# Patient Record
Sex: FEMALE | Race: WHITE | NOT HISPANIC OR LATINO | Employment: OTHER | URBAN - METROPOLITAN AREA
[De-identification: names, ages, dates, MRNs, and addresses within clinical notes are randomized per-mention and may not be internally consistent; named-entity substitution may affect disease eponyms.]

---

## 2017-01-13 ENCOUNTER — LAB VISIT (OUTPATIENT)
Dept: LAB | Facility: HOSPITAL | Age: 77
End: 2017-01-13
Attending: INTERNAL MEDICINE
Payer: MEDICARE

## 2017-01-13 ENCOUNTER — OFFICE VISIT (OUTPATIENT)
Dept: TRANSPLANT | Facility: CLINIC | Age: 77
End: 2017-01-13
Attending: INTERNAL MEDICINE
Payer: MEDICARE

## 2017-01-13 VITALS
SYSTOLIC BLOOD PRESSURE: 165 MMHG | WEIGHT: 101.63 LBS | BODY MASS INDEX: 19.19 KG/M2 | DIASTOLIC BLOOD PRESSURE: 77 MMHG | HEIGHT: 61 IN | HEART RATE: 57 BPM

## 2017-01-13 DIAGNOSIS — I25.2 OLD MI (MYOCARDIAL INFARCTION): ICD-10-CM

## 2017-01-13 DIAGNOSIS — E78.5 HYPERLIPIDEMIA WITH TARGET LDL LESS THAN 70: ICD-10-CM

## 2017-01-13 DIAGNOSIS — I25.10 CORONARY ARTERY DISEASE DUE TO LIPID RICH PLAQUE: ICD-10-CM

## 2017-01-13 DIAGNOSIS — I10 ESSENTIAL HYPERTENSION: ICD-10-CM

## 2017-01-13 DIAGNOSIS — K21.9 GASTROESOPHAGEAL REFLUX DISEASE WITHOUT ESOPHAGITIS: ICD-10-CM

## 2017-01-13 DIAGNOSIS — I25.83 CORONARY ARTERY DISEASE DUE TO LIPID RICH PLAQUE: ICD-10-CM

## 2017-01-13 DIAGNOSIS — H34.8112 CENTRAL RETINAL VEIN OCCLUSION OF RIGHT EYE: ICD-10-CM

## 2017-01-13 DIAGNOSIS — J96.11 CHRONIC RESPIRATORY FAILURE WITH HYPOXIA: ICD-10-CM

## 2017-01-13 DIAGNOSIS — I25.118 CORONARY ARTERY DISEASE OF NATIVE ARTERY OF NATIVE HEART WITH STABLE ANGINA PECTORIS: Primary | ICD-10-CM

## 2017-01-13 LAB
ALBUMIN SERPL BCP-MCNC: 3.5 G/DL
ALP SERPL-CCNC: 85 U/L
ALT SERPL W/O P-5'-P-CCNC: 14 U/L
ANION GAP SERPL CALC-SCNC: 8 MMOL/L
AST SERPL-CCNC: 21 U/L
BILIRUB SERPL-MCNC: 0.3 MG/DL
BUN SERPL-MCNC: 18 MG/DL
CALCIUM SERPL-MCNC: 8.4 MG/DL
CHLORIDE SERPL-SCNC: 107 MMOL/L
CHOLEST/HDLC SERPL: 2.9 {RATIO}
CO2 SERPL-SCNC: 25 MMOL/L
CREAT SERPL-MCNC: 0.7 MG/DL
EST. GFR  (AFRICAN AMERICAN): >60 ML/MIN/1.73 M^2
EST. GFR  (NON AFRICAN AMERICAN): >60 ML/MIN/1.73 M^2
GLUCOSE SERPL-MCNC: 80 MG/DL
HDL/CHOLESTEROL RATIO: 34.6 %
HDLC SERPL-MCNC: 191 MG/DL
HDLC SERPL-MCNC: 66 MG/DL
LDLC SERPL CALC-MCNC: 114.4 MG/DL
NONHDLC SERPL-MCNC: 125 MG/DL
POTASSIUM SERPL-SCNC: 4.8 MMOL/L
PROT SERPL-MCNC: 6.7 G/DL
SODIUM SERPL-SCNC: 140 MMOL/L
TRIGL SERPL-MCNC: 53 MG/DL

## 2017-01-13 PROCEDURE — 1159F MED LIST DOCD IN RCRD: CPT | Mod: S$GLB,,, | Performed by: INTERNAL MEDICINE

## 2017-01-13 PROCEDURE — 99999 PR PBB SHADOW E&M-EST. PATIENT-LVL III: CPT | Mod: PBBFAC,,, | Performed by: INTERNAL MEDICINE

## 2017-01-13 PROCEDURE — 3077F SYST BP >= 140 MM HG: CPT | Mod: S$GLB,,, | Performed by: INTERNAL MEDICINE

## 2017-01-13 PROCEDURE — 1157F ADVNC CARE PLAN IN RCRD: CPT | Mod: S$GLB,,, | Performed by: INTERNAL MEDICINE

## 2017-01-13 PROCEDURE — 1160F RVW MEDS BY RX/DR IN RCRD: CPT | Mod: S$GLB,,, | Performed by: INTERNAL MEDICINE

## 2017-01-13 PROCEDURE — 1125F AMNT PAIN NOTED PAIN PRSNT: CPT | Mod: S$GLB,,, | Performed by: INTERNAL MEDICINE

## 2017-01-13 PROCEDURE — 3078F DIAST BP <80 MM HG: CPT | Mod: S$GLB,,, | Performed by: INTERNAL MEDICINE

## 2017-01-13 PROCEDURE — 99214 OFFICE O/P EST MOD 30 MIN: CPT | Mod: S$GLB,,, | Performed by: INTERNAL MEDICINE

## 2017-01-13 RX ORDER — CHLORTHALIDONE 25 MG/1
25 TABLET ORAL DAILY
Qty: 30 TABLET | Refills: 5 | Status: SHIPPED | OUTPATIENT
Start: 2017-01-13 | End: 2017-09-18 | Stop reason: SDUPTHER

## 2017-01-13 RX ORDER — PANTOPRAZOLE SODIUM 40 MG/1
40 TABLET, DELAYED RELEASE ORAL DAILY
Qty: 30 TABLET | Refills: 11 | Status: SHIPPED | OUTPATIENT
Start: 2017-01-13 | End: 2018-01-13

## 2017-01-13 NOTE — PATIENT INSTRUCTIONS
Increase chlorthalidone to 25 mg daily  Consider nocturnal dose of clonidine 0.1 mg but with dizziness limiting treatment may not be able to lower BP to normal range  Position bed on blocks to gently slope upper body higher--suggest cutting 4x4 in 6 inch length-may help dizziness and lessen BP elevation at night when supine  Add protonix 40 mg daily for reflux

## 2017-01-13 NOTE — Clinical Note
Dr. DELIO Hogan is retina specialist but not in our system--please mail him note--if you cannot find patient can supply address

## 2017-01-13 NOTE — PROGRESS NOTES
"Subjective:     Patient ID:  Neli Grullon is a 76 y.o. female who presents for follow-up of Hypertension and Coronary Artery Disease    HPI:  77 yo WF with history of coronary artery disease with prior myocardial infarction (angina is manifested as pain in the roof of her mouth) is here for routine f/u.  Last seen 6/18/16 and started on chlorthalidone 25 mg QOD for elevated BP.  A BMP done 6/24/16 was fine on treatment.  She does occasionally forget a dose but not often.  She went to see Dr. Strauss for eye exam due to decline--blurring--of vision in Oct 2016 thinking that she needed new glasses.  He noted central vein occlusion and referred to Dr. Hogan who treated her with Avastatin injections 10/20/16, 10/20/16 and scheduled for third this month on the 19th.  He noted findings of hypertensive retinopathy.  She has not been monitoring BP but sees Dr. Noel every 3 months and BP with him is "OK."    She has sporadic discomfort in roof of mouth which is her angina.  No change.  Chronic oxygen and SOB unchanged.  Some pain and swelling left foot intermittently.  She notes a coolness of both feet that she warms with blanket or cover.     Review of Systems   Constitution: Positive for weakness and malaise/fatigue. Negative for chills, fever, night sweats, weight gain and weight loss.   Eyes: Positive for vision loss in right eye (see HPI).   Cardiovascular: Positive for chest pain (roof of mouth discomfort is her angina--no change) and dyspnea on exertion. Negative for irregular heartbeat, leg swelling, near-syncope, orthopnea, palpitations, paroxysmal nocturnal dyspnea and syncope.        Raynaud has not changed   Respiratory: Positive for cough, shortness of breath (on chronic oxygen), sputum production (clear) and wheezing (occasional).         Dr. Guerra following nodule on lung and to have [facility]   Hematologic/Lymphatic: Bruises/bleeds easily (bruising).   Musculoskeletal: Positive for arthritis, back " "pain, joint pain, joint swelling and stiffness. Negative for falls (very careful and says if she can touch wall balance OK then).   Gastrointestinal: Positive for heartburn (reflux--old but seems worse lately). Negative for hematochezia and melena.   Genitourinary: Negative for hematuria.   Neurological: Positive for dizziness (positional--standing--and on occasion fears falling--says she fell when broke hip Nov 2015 and fearful and wonders if this played role in her fall then; she has had for yrs off and on but no change with diuretic started last visit ). Negative for brief paralysis, focal weakness and seizures.     Objective:   Physical Exam   Constitutional: She is oriented to person, place, and time. No distress.   BP (!) 165/77 by nurse with auto cuff upon arrival(BP Location: Left arm, Patient Position: Sitting, BP Method: Automatic)  Pulse (!) 57  Ht 5' 1" (1.549 m)  Wt 46.1 kg (101 lb 10.1 oz)  BMI 19.2 kg/m2  BP by me manually:  Right 178/94 sitting  Left 172/92 supine  Left 160/90 sitting and some dizziness  Left 140/82 standing and very dizzy to point that she fears falling--she tries to get up more slowly at home to avoid this   HENT:   Head: Normocephalic and atraumatic.   Eyes: Conjunctivae are normal. No scleral icterus.   Neck: No JVD present. No thyromegaly present.   Cardiovascular: Normal rate and regular rhythm.  Exam reveals no gallop.    No murmur heard.  Heart tones distant   Pulmonary/Chest: Effort normal. She has no wheezes. She has rales (quiet BD with few dry crackles right base).   Abdominal: Soft. Bowel sounds are normal. She exhibits no distension. There is no tenderness.   Musculoskeletal: She exhibits no edema or tenderness.   Neurological: She is alert and oriented to person, place, and time.   Skin: Skin is warm and dry. She is not diaphoretic.   Psychiatric: She has a normal mood and affect. Her behavior is normal. Judgment and thought content normal.      Lipids and CMP pending " from today when seen  Assessment:     1. Coronary artery disease of native artery of native heart with stable angina pectoris    2. Essential hypertension    3. Central retinal vein occlusion of right eye    4. Gastroesophageal reflux disease without esophagitis    5. Hyperlipidemia with target LDL less than 70    6. Old MI (myocardial infarction)    7. Chronic respiratory failure with hypoxia      Plan:   I will call lab results when they return  Increase chlorthalidone to 25 mg daily  Consider nocturnal dose of clonidine 0.1 mg but with dizziness and orthostasis limiting treatment may not be able to lower BP to normal range  Position bed on blocks to gently slope upper body higher--suggest cutting 4x4 in 6 inch length  Add protonix 40 mg daily for reflux  She sees Dr. Noel next month and he can check BP for us and RTC 3 months if OK in his office    Note sent to Dr. Mcdaniels but Dr. Hogan not in computer system--will mail copy

## 2017-01-13 NOTE — MR AVS SNAPSHOT
[facility]  [address]  [place] [address]  Phone: [phone]                  Neli Grullon   2017 11:30 AM   Office Visit    Description:  Female : 1940   Provider:  Bib Holloway Jr., MD   Department:  [facility]           Reason for Visit     Hypertension           Diagnoses this Visit        Comments    Essential hypertension         Central retinal vein occlusion of right eye         Gastroesophageal reflux disease without esophagitis                To Do List           Goals (5 Years of Data)     None      Follow-Up and Disposition     Return in about 3 months (around 2017).       These Medications        Disp Refills Start End    chlorthalidone (HYGROTEN) 25 MG Tab 30 tablet 5 2017     Take 1 tablet (25 mg total) by mouth once daily. - Oral    Pharmacy: [id number] [place] [address] [address] [facility] Ph #: [phone]       pantoprazole (PROTONIX) 40 MG tablet 30 tablet 11 2017    Take 1 tablet (40 mg total) by mouth once daily. - Oral    Pharmacy:  [facility] [address] [place] [address] - [address] [facility] Ph #: [phone]         [facility]     [facility] -  Assistance  Registered nurses in the [facility] provide clinical advisement, health education, appointment booking, and other advisory services.  Call for this free service at [phone].             Medications           Message regarding Medications     Verify the changes and/or additions to your medication regime listed below are the same as discussed with your clinician today.  If any of these changes or additions are incorrect, please notify your healthcare provider.        START taking these NEW medications        Refills    pantoprazole (PROTONIX) 40 MG tablet 11    Sig: Take 1 tablet (40 mg total) by mouth once daily.    Class: Normal    Route: Oral      CHANGE how you are taking these  medications     Start Taking Instead of    chlorthalidone (HYGROTEN) 25 MG Tab chlorthalidone (HYGROTEN) 25 MG Tab    Dosage:  Take 1 tablet (25 mg total) by mouth once daily. Dosage:  Take 1 tablet (25 mg total) by mouth every other day.    Reason for Change:  Reorder            Verify that the below list of medications is an accurate representation of the medications you are currently taking.  If none reported, the list may be blank. If incorrect, please contact your healthcare provider. Carry this list with you in case of emergency.           Current Medications     albuterol (PROAIR HFA) 90 mcg/actuation HFAA Inhale 2 puffs into the lungs every 6 (six) hours as needed.      aspirin 81 MG Chew Take 1 tablet (81 mg total) by mouth once daily.    atorvastatin (LIPITOR) 80 MG tablet TAKE 1 TABLET (80 MG TOTAL) BY MOUTH ONCE DAILY.    bisoprolol (ZEBETA) 5 MG tablet Take 1 tablet (5 mg total) by mouth once daily.    budesonide-formoterol 160-4.5 mcg (SYMBICORT) 160-4.5 mcg/actuation HFAA Inhale 2 puffs into the lungs every 12 (twelve) hours.    butalbital-acetaminophen-caffeine -40 mg (FIORICET, ESGIC) -40 mg per tablet Take 1 tablet by mouth every 4 (four) hours as needed.      chlorthalidone (HYGROTEN) 25 MG Tab Take 1 tablet (25 mg total) by mouth once daily.    clopidogrel (PLAVIX) 75 mg tablet Take 1 tablet (75 mg total) by mouth once daily.    dextromethorphan-guaifenesin  mg (MUCINEX DM)  mg per 12 hr tablet Take 1 tablet by mouth 2 (two) times daily as needed.      diphenoxylate-atropine 2.5-0.025 mg (LOMOTIL) 2.5-0.025 mg per tablet Take 1 tablet by mouth 4 (four) times daily as needed.      fexofenadine (ALLEGRA) 180 MG tablet Take 180 mg by mouth daily as needed.      gabapentin (NEURONTIN) 300 MG capsule Take 1 capsule by mouth once daily.    hydrocodone-acetaminophen 7.5-325mg (NORCO) 7.5-325 mg per tablet Take 1 tablet by mouth every 6 (six) hours as needed. As needed     "lisinopril 10 MG tablet Take 1 tablet (10 mg total) by mouth once daily.    pantoprazole (PROTONIX) 40 MG tablet Take 1 tablet (40 mg total) by mouth once daily.    predniSONE (DELTASONE) 10 MG tablet Take 10 mg by mouth once daily.           Clinical Reference Information           Vital Signs - Last Recorded  Most recent update: 1/13/2017 11:02 AM by Juana Jansen MA    BP Pulse Ht Wt BMI    (!) 165/77 (BP Location: Left arm, Patient Position: Sitting, BP Method: Automatic) (!) 57 5' 1" (1.549 m) 46.1 kg (101 lb 10.1 oz) 19.2 kg/m2      Blood Pressure          Most Recent Value    Right Arm BP - Sitting  178/94    Left Arm BP - Supine  160/90    Left Arm BP - Sitting  172/92    Left Arm BP - Standing  140/82    BP  (!)  165/77      Allergies as of 1/13/2017     Amlodipine    Sulfa (Sulfonamide Antibiotics)      Immunizations Administered on Date of Encounter - 1/13/2017     None      Instructions    Increase chlorthalidone to 25 mg daily  Consider nocturnal dose of clonidine 0.1 mg but with dizziness limiting treatment may not be able to lower BP to normal range  Position bed on blocks to gently slope upper body higher--suggest cutting 4x4 in 6 inch length-may help dizziness and lessen BP elevation at night when supine  Add protonix 40 mg daily for reflux       "

## 2017-04-24 ENCOUNTER — OFFICE VISIT (OUTPATIENT)
Dept: TRANSPLANT | Facility: CLINIC | Age: 77
End: 2017-04-24
Attending: INTERNAL MEDICINE
Payer: MEDICARE

## 2017-04-24 VITALS
SYSTOLIC BLOOD PRESSURE: 138 MMHG | OXYGEN SATURATION: 98 % | BODY MASS INDEX: 18.48 KG/M2 | WEIGHT: 97.88 LBS | DIASTOLIC BLOOD PRESSURE: 77 MMHG | HEART RATE: 58 BPM | HEIGHT: 61 IN

## 2017-04-24 DIAGNOSIS — I10 ESSENTIAL HYPERTENSION: ICD-10-CM

## 2017-04-24 DIAGNOSIS — I25.2 OLD MI (MYOCARDIAL INFARCTION): ICD-10-CM

## 2017-04-24 DIAGNOSIS — E78.5 HYPERLIPIDEMIA WITH TARGET LDL LESS THAN 70: ICD-10-CM

## 2017-04-24 DIAGNOSIS — I25.111 CORONARY ARTERY DISEASE INVOLVING NATIVE CORONARY ARTERY OF NATIVE HEART WITH ANGINA PECTORIS WITH DOCUMENTED SPASM: ICD-10-CM

## 2017-04-24 PROCEDURE — 1159F MED LIST DOCD IN RCRD: CPT | Mod: S$GLB,,, | Performed by: INTERNAL MEDICINE

## 2017-04-24 PROCEDURE — 99213 OFFICE O/P EST LOW 20 MIN: CPT | Mod: S$GLB,,, | Performed by: INTERNAL MEDICINE

## 2017-04-24 PROCEDURE — 1160F RVW MEDS BY RX/DR IN RCRD: CPT | Mod: S$GLB,,, | Performed by: INTERNAL MEDICINE

## 2017-04-24 PROCEDURE — 1157F ADVNC CARE PLAN IN RCRD: CPT | Mod: S$GLB,,, | Performed by: INTERNAL MEDICINE

## 2017-04-24 PROCEDURE — 1126F AMNT PAIN NOTED NONE PRSNT: CPT | Mod: S$GLB,,, | Performed by: INTERNAL MEDICINE

## 2017-04-24 PROCEDURE — 99999 PR PBB SHADOW E&M-EST. PATIENT-LVL III: CPT | Mod: PBBFAC,,, | Performed by: INTERNAL MEDICINE

## 2017-04-24 PROCEDURE — 3078F DIAST BP <80 MM HG: CPT | Mod: S$GLB,,, | Performed by: INTERNAL MEDICINE

## 2017-04-24 PROCEDURE — 3075F SYST BP GE 130 - 139MM HG: CPT | Mod: S$GLB,,, | Performed by: INTERNAL MEDICINE

## 2017-04-24 RX ORDER — ATORVASTATIN CALCIUM 80 MG/1
80 TABLET, FILM COATED ORAL NIGHTLY
Qty: 30 TABLET | Refills: 11 | Status: SHIPPED | OUTPATIENT
Start: 2017-04-24

## 2017-04-24 RX ORDER — CLOPIDOGREL BISULFATE 75 MG/1
75 TABLET ORAL DAILY
Qty: 30 TABLET | Refills: 11 | Status: SHIPPED | OUTPATIENT
Start: 2017-04-24

## 2017-04-24 RX ORDER — BISOPROLOL FUMARATE 5 MG/1
5 TABLET, FILM COATED ORAL DAILY
Qty: 30 TABLET | Refills: 11 | Status: SHIPPED | OUTPATIENT
Start: 2017-04-24

## 2017-04-24 RX ORDER — LISINOPRIL 10 MG/1
10 TABLET ORAL DAILY
Qty: 30 TABLET | Refills: 11 | Status: SHIPPED | OUTPATIENT
Start: 2017-04-24

## 2017-04-24 RX ORDER — TIOTROPIUM BROMIDE 18 UG/1
CAPSULE ORAL; RESPIRATORY (INHALATION)
COMMUNITY
Start: 2017-04-11

## 2017-05-01 NOTE — PROGRESS NOTES
"Subjective:     Patient ID:  Neli Grullon is a 76 y.o. female who presents for follow-up of Coronary Artery Disease    HPI:  75 yo WF with history of coronary artery disease with prior myocardial infarction (angina is manifested as pain in the roof of her mouth) is here for routine f/u.  She has discomfort in roof of mouth which is her angina and reports today this may be worse now occurring 2-3x/week.  Chronic oxygen and SOB unchanged.    ROS no bleeding, melena, etc     Objective:   Physical Exam   Constitutional: No distress.   /77  Pulse (!) 58  Ht 5' 1" (1.549 m)  Wt 44.4 kg (97 lb 14.2 oz)  SpO2 98%  BMI 18.5 kg/m2  Cachectic WF in NAD but appears chronically ill   HENT:   Head: Normocephalic and atraumatic.   Eyes: Conjunctivae are normal.   Neck: No JVD present.   Cardiovascular: Normal rate, regular rhythm and normal heart sounds.  Exam reveals no gallop.    No murmur heard.  Pulmonary/Chest: Effort normal. She has rales (scattered dry crackles).   Abdominal: Soft. Bowel sounds are normal. She exhibits no distension. There is no tenderness.   Musculoskeletal: She exhibits no edema or tenderness.   Skin: Skin is warm and dry. She is not diaphoretic.      Assessment:     1. Coronary artery disease involving native coronary artery of native heart with angina pectoris with documented spasm    2. Essential hypertension    3. Old MI (myocardial infarction)    4. Hyperlipidemia with target LDL less than 70      Plan:   She is not sure that she wants to proceed with  stress nuclear exam (would hold bisoprolol day before and morning of the test) and will let me know if she changes her mind.  She understands the risks that this may represent increasing angina but still prefers to think it over.  Dtr was with her and is aware.  If all OK RTC 6 months on Thrus or Fri with lab before visit--CBC, CMP and lipids  "

## 2017-09-18 DIAGNOSIS — I10 ESSENTIAL HYPERTENSION: ICD-10-CM

## 2017-09-19 RX ORDER — CHLORTHALIDONE 25 MG/1
TABLET ORAL
Qty: 30 TABLET | Refills: 4 | Status: SHIPPED | OUTPATIENT
Start: 2017-09-19

## 2018-03-14 ENCOUNTER — HOSPITAL ENCOUNTER (EMERGENCY)
Facility: HOSPITAL | Age: 78
Discharge: HOME | End: 2018-03-14
Attending: EMERGENCY MEDICINE
Payer: MEDICARE

## 2018-03-14 VITALS
WEIGHT: 105 LBS | HEIGHT: 62 IN | DIASTOLIC BLOOD PRESSURE: 89 MMHG | BODY MASS INDEX: 19.32 KG/M2 | TEMPERATURE: 98 F | OXYGEN SATURATION: 97 % | HEART RATE: 110 BPM | SYSTOLIC BLOOD PRESSURE: 127 MMHG

## 2018-03-14 DIAGNOSIS — M79.673 FOOT PAIN: ICD-10-CM

## 2018-03-14 DIAGNOSIS — M79.606 LEG PAIN: ICD-10-CM

## 2018-03-14 DIAGNOSIS — M79.672 LEFT FOOT PAIN: Primary | ICD-10-CM

## 2018-03-14 DIAGNOSIS — M79.605 LEFT LEG PAIN: ICD-10-CM

## 2018-03-14 LAB
ANION GAP SERPL CALC-SCNC: 9 MMOL/L
BASOPHILS # BLD AUTO: 0.02 K/UL
BASOPHILS NFR BLD: 0.4 %
BUN SERPL-MCNC: 6 MG/DL
CALCIUM SERPL-MCNC: 9.1 MG/DL
CHLORIDE SERPL-SCNC: 105 MMOL/L
CO2 SERPL-SCNC: 25 MMOL/L
CREAT SERPL-MCNC: 0.6 MG/DL
DIFFERENTIAL METHOD: ABNORMAL
EOSINOPHIL # BLD AUTO: 0.3 K/UL
EOSINOPHIL NFR BLD: 5.7 %
ERYTHROCYTE [DISTWIDTH] IN BLOOD BY AUTOMATED COUNT: 12.9 %
EST. GFR  (AFRICAN AMERICAN): >60 ML/MIN/1.73 M^2
EST. GFR  (NON AFRICAN AMERICAN): >60 ML/MIN/1.73 M^2
GLUCOSE SERPL-MCNC: 104 MG/DL
HCT VFR BLD AUTO: 34 %
HGB BLD-MCNC: 10.5 G/DL
LYMPHOCYTES # BLD AUTO: 1.4 K/UL
LYMPHOCYTES NFR BLD: 27 %
MCH RBC QN AUTO: 29.7 PG
MCHC RBC AUTO-ENTMCNC: 30.9 G/DL
MCV RBC AUTO: 96 FL
MONOCYTES # BLD AUTO: 0.6 K/UL
MONOCYTES NFR BLD: 12.3 %
NEUTROPHILS # BLD AUTO: 2.8 K/UL
NEUTROPHILS NFR BLD: 54.4 %
PLATELET # BLD AUTO: 252 K/UL
PMV BLD AUTO: 8.6 FL
POTASSIUM SERPL-SCNC: 4 MMOL/L
RBC # BLD AUTO: 3.53 M/UL
SODIUM SERPL-SCNC: 139 MMOL/L
WBC # BLD AUTO: 5.12 K/UL

## 2018-03-14 PROCEDURE — 85025 COMPLETE CBC W/AUTO DIFF WBC: CPT

## 2018-03-14 PROCEDURE — 80048 BASIC METABOLIC PNL TOTAL CA: CPT

## 2018-03-14 PROCEDURE — 99285 EMERGENCY DEPT VISIT HI MDM: CPT

## 2018-03-14 NOTE — ED PROVIDER NOTES
"Encounter Date: 3/14/2018       History     Chief Complaint   Patient presents with    Foot Pain     left foot pain.  Patient is in Hospice at home for kidney failure.  No history of injury.     77-year-old female with past medical history of COPD, CAD, hypertension, osteoporosis, pulmonary artery hypertension, and CKD for which she is in hospice presents to the ER for left foot pain.  The patient reports that several days ago upon transferring from bed to chair, she feels her left foot may have been "crushed" in between the bed and chair.  She reports pain at the lateral aspect of her left foot which is worse with certain positions and palpation.  She also reports left leg pain for the past several months.  The patient is concerned that she may have a blood clot.  She denies chest pain, shortness of breath, hemoptysis, and palpitations.  No fever or chills.  She is on fentanyl and Fritch at home, which does seem to help with pain. The pt is bed-bound. The patient reports that she is no longer on any anticoagulants.  The patient and family are requesting "blood work to check kidney function."  This is the extent of the patient's complaints at this time.        The history is provided by the patient and a relative.     Review of patient's allergies indicates:   Allergen Reactions    Amlodipine Other (See Comments)     dizzy    Sulfa (sulfonamide antibiotics)      Unknown reaction, occurred as a child     Past Medical History:   Diagnosis Date    Anticoagulant long-term use     Arthritis     Basal cell cancer     to nose    Central retinal vein occlusion of right eye 1/13/2017    COPD (chronic obstructive pulmonary disease)     Coronary artery disease     Emphysema lung     Hypertension     Old MI (myocardial infarction) 9/16/2014    Osteoporosis     Osteoporosis, senile     PONV (postoperative nausea and vomiting)     Pulmonary artery hypertension 1/23/2015    Spinal stenosis     STEMI (ST elevation " myocardial infarction)      Past Surgical History:   Procedure Laterality Date    ABDOMINAL SURGERY      APPENDECTOMY      BACK SURGERY      Division and Inset  7/15/2014    Paramedian Forehead Flap repair    FRACTURE SURGERY      both hips last on right late 2015    HYSTERECTOMY      JOINT REPLACEMENT       Family History   Problem Relation Age of Onset    No Known Problems Mother     No Known Problems Father     No Known Problems Sister     No Known Problems Brother     No Known Problems Maternal Aunt     No Known Problems Maternal Uncle     No Known Problems Paternal Aunt     No Known Problems Paternal Uncle     No Known Problems Maternal Grandmother     No Known Problems Maternal Grandfather     No Known Problems Paternal Grandmother     No Known Problems Paternal Grandfather     Amblyopia Neg Hx     Blindness Neg Hx     Cancer Neg Hx     Cataracts Neg Hx     Diabetes Neg Hx     Glaucoma Neg Hx     Hypertension Neg Hx     Macular degeneration Neg Hx     Retinal detachment Neg Hx     Strabismus Neg Hx     Stroke Neg Hx     Thyroid disease Neg Hx      Social History   Substance Use Topics    Smoking status: Former Smoker     Packs/day: 0.25     Years: 50.00    Smokeless tobacco: Never Used    Alcohol use No     Review of Systems   Constitutional: Negative for chills and fever.   HENT: Negative for congestion.    Respiratory: Negative for cough and shortness of breath.    Cardiovascular: Negative for chest pain and palpitations.   Gastrointestinal: Negative for abdominal pain and vomiting.   Musculoskeletal: Positive for arthralgias.   Skin: Negative for rash and wound.   Neurological: Positive for weakness (generalized) and numbness (occasional feet bilaterally.). Negative for dizziness and headaches.   Psychiatric/Behavioral: Negative for confusion.       Physical Exam     Initial Vitals [03/14/18 0935]   BP Pulse Resp Temp SpO2   127/89 110 -- 98.3 °F (36.8 °C) 97 %      MAP        101.67         Physical Exam    Nursing note and vitals reviewed.  Constitutional: Vital signs are normal. She appears well-developed. She appears cachectic. She is active and cooperative. She is easily aroused.  Non-toxic appearance. She has a sickly appearance. She does not appear ill. No distress. Nasal cannula in place.   HENT:   Head: Normocephalic and atraumatic.   Right Ear: External ear normal.   Left Ear: External ear normal.   Nose: Nose normal.   Eyes: Conjunctivae and EOM are normal.   Neck: Normal range of motion. Neck supple.   Cardiovascular: Regular rhythm and normal heart sounds. Tachycardia present.    Pulses:       Dorsalis pedis pulses are 1+ on the right side, and 1+ on the left side.   Pulmonary/Chest: Effort normal and breath sounds normal.   Abdominal: Soft. Normal appearance and bowel sounds are normal. She exhibits no distension. There is no tenderness. There is no rigidity, no rebound and no guarding.   Musculoskeletal:        Left knee: Normal.        Left ankle: Normal.        Right upper leg: Normal.        Left upper leg: Normal.        Right lower leg: Normal.        Left lower leg: Normal.        Left foot: There is tenderness. There is normal range of motion, no bony tenderness, no swelling, normal capillary refill, no crepitus, no deformity and no laceration.   Negative Ariadna's sign bilaterally.    Neurological: She is alert, oriented to person, place, and time and easily aroused. She has normal strength. No sensory deficit. GCS eye subscore is 4. GCS verbal subscore is 5. GCS motor subscore is 6.   Skin: Skin is warm, dry and intact. Capillary refill takes less than 2 seconds. No abrasion, no bruising, no ecchymosis and no rash noted. No erythema.   Psychiatric: She has a normal mood and affect. Her speech is normal and behavior is normal. Judgment and thought content normal. Cognition and memory are normal.         ED Course   Procedures  Labs Reviewed   CBC W/ AUTO  DIFFERENTIAL - Abnormal; Notable for the following:        Result Value    RBC 3.53 (*)     Hemoglobin 10.5 (*)     Hematocrit 34.0 (*)     MCHC 30.9 (*)     MPV 8.6 (*)     All other components within normal limits   BASIC METABOLIC PANEL - Abnormal; Notable for the following:     BUN, Bld 6 (*)     All other components within normal limits         Imaging Results          US Lower Extremity Veins Left (In process)                X-Ray Foot Complete Left (Final result)  Result time 03/14/18 10:51:17    Final result by George Miles MD (03/14/18 10:51:17)                 Impression:          No evidence of fracture or dislocation. Consider short-term followup if symptoms persist or worsen.      Electronically signed by: GEORGE MILES MD  Date:     03/14/18  Time:    10:51              Narrative:    XR foot    03/14/18 10:43:12    Accession# [id number]    CLINICAL INDICATION: 77 year old F with  pain    TECHNIQUE: 3 views of the left foot.    COMPARISON: None    FINDINGS:    Diffuse osteopenia. No displaced fracture identified. No evidence of dislocation.   Mild degenerative changes. Joint spaces are preserved.  No focal soft tissue swelling or radiopaque foreign body.                                                Attending Attestation:     Physician Attestation Statement for NP/PA:   I reviewed the chart but I did not personally examine the patient. The face to face encounter was performed by the NP/PA.                     Clinical Impression:   Diagnoses of Foot pain and Leg pain were pertinent to this visit.                           Jed Tarango MD  03/14/18 1507       Jed Tarango MD  03/14/18 1502

## 2018-03-14 NOTE — DISCHARGE INSTRUCTIONS
Continue your pain medications as prescribed.  Return to the ED if your condition changes, progresses, or if you have any concerns.

## 2019-01-01 ENCOUNTER — HOSPITAL ENCOUNTER (INPATIENT)
Facility: HOSPITAL | Age: 79
LOS: 1 days | DRG: 296 | End: 2019-05-27
Attending: EMERGENCY MEDICINE | Admitting: EMERGENCY MEDICINE
Payer: MEDICARE

## 2019-01-01 VITALS
BODY MASS INDEX: 19.32 KG/M2 | DIASTOLIC BLOOD PRESSURE: 64 MMHG | OXYGEN SATURATION: 93 % | HEIGHT: 62 IN | SYSTOLIC BLOOD PRESSURE: 128 MMHG | WEIGHT: 105 LBS | TEMPERATURE: 99 F | HEART RATE: 107 BPM | RESPIRATION RATE: 5 BRPM

## 2019-01-01 DIAGNOSIS — Z71.89 GOALS OF CARE, COUNSELING/DISCUSSION: ICD-10-CM

## 2019-01-01 DIAGNOSIS — I46.9 CARDIAC ARREST: ICD-10-CM

## 2019-01-01 DIAGNOSIS — J96.90 RESPIRATORY FAILURE, UNSPECIFIED CHRONICITY, UNSPECIFIED WHETHER WITH HYPOXIA OR HYPERCAPNIA: Primary | ICD-10-CM

## 2019-01-01 DIAGNOSIS — R06.02 SHORTNESS OF BREATH: ICD-10-CM

## 2019-01-01 LAB
ABO + RH BLD: NORMAL
ALBUMIN SERPL BCP-MCNC: 2.7 G/DL (ref 3.5–5.2)
ALBUMIN SERPL BCP-MCNC: 3.1 G/DL (ref 3.5–5.2)
ALLENS TEST: ABNORMAL
ALLENS TEST: ABNORMAL
ALLENS TEST: NORMAL
ALP SERPL-CCNC: 78 U/L (ref 55–135)
ALP SERPL-CCNC: 84 U/L (ref 55–135)
ALT SERPL W/O P-5'-P-CCNC: 26 U/L (ref 10–44)
ALT SERPL W/O P-5'-P-CCNC: 48 U/L (ref 10–44)
ANION GAP SERPL CALC-SCNC: 12 MMOL/L (ref 8–16)
ANION GAP SERPL CALC-SCNC: 23 MMOL/L (ref 8–16)
APTT BLDCRRT: 27.1 SEC (ref 21–32)
AST SERPL-CCNC: 128 U/L (ref 10–40)
AST SERPL-CCNC: 68 U/L (ref 10–40)
BACTERIA #/AREA URNS AUTO: ABNORMAL /HPF
BASOPHILS # BLD AUTO: 0.03 K/UL (ref 0–0.2)
BASOPHILS # BLD AUTO: 0.04 K/UL (ref 0–0.2)
BASOPHILS NFR BLD: 0.2 % (ref 0–1.9)
BASOPHILS NFR BLD: 0.4 % (ref 0–1.9)
BILIRUB SERPL-MCNC: 0.2 MG/DL (ref 0.1–1)
BILIRUB SERPL-MCNC: 0.3 MG/DL (ref 0.1–1)
BILIRUB UR QL STRIP: NEGATIVE
BLD GP AB SCN CELLS X3 SERPL QL: NORMAL
BNP SERPL-MCNC: 138 PG/ML (ref 0–99)
BUN SERPL-MCNC: 12 MG/DL (ref 8–23)
BUN SERPL-MCNC: 17 MG/DL (ref 8–23)
CALCIUM SERPL-MCNC: 8.2 MG/DL (ref 8.7–10.5)
CALCIUM SERPL-MCNC: 8.4 MG/DL (ref 8.7–10.5)
CHLORIDE SERPL-SCNC: 106 MMOL/L (ref 95–110)
CHLORIDE SERPL-SCNC: 107 MMOL/L (ref 95–110)
CLARITY UR REFRACT.AUTO: ABNORMAL
CO2 SERPL-SCNC: 13 MMOL/L (ref 23–29)
CO2 SERPL-SCNC: 23 MMOL/L (ref 23–29)
COLOR UR AUTO: YELLOW
CREAT SERPL-MCNC: 0.7 MG/DL (ref 0.5–1.4)
CREAT SERPL-MCNC: 0.8 MG/DL (ref 0.5–1.4)
DELSYS: ABNORMAL
DIFFERENTIAL METHOD: ABNORMAL
DIFFERENTIAL METHOD: ABNORMAL
EOSINOPHIL # BLD AUTO: 0 K/UL (ref 0–0.5)
EOSINOPHIL # BLD AUTO: 0.3 K/UL (ref 0–0.5)
EOSINOPHIL NFR BLD: 0.3 % (ref 0–8)
EOSINOPHIL NFR BLD: 2.5 % (ref 0–8)
ERYTHROCYTE [DISTWIDTH] IN BLOOD BY AUTOMATED COUNT: 14.7 % (ref 11.5–14.5)
ERYTHROCYTE [DISTWIDTH] IN BLOOD BY AUTOMATED COUNT: 14.9 % (ref 11.5–14.5)
ERYTHROCYTE [SEDIMENTATION RATE] IN BLOOD BY WESTERGREN METHOD: 22 MM/H
EST. GFR  (AFRICAN AMERICAN): >60 ML/MIN/1.73 M^2
EST. GFR  (AFRICAN AMERICAN): >60 ML/MIN/1.73 M^2
EST. GFR  (NON AFRICAN AMERICAN): >60 ML/MIN/1.73 M^2
EST. GFR  (NON AFRICAN AMERICAN): >60 ML/MIN/1.73 M^2
FIO2: 50
GLUCOSE SERPL-MCNC: 140 MG/DL (ref 70–110)
GLUCOSE SERPL-MCNC: 246 MG/DL (ref 70–110)
GLUCOSE UR QL STRIP: ABNORMAL
HCO3 UR-SCNC: 16.1 MMOL/L (ref 24–28)
HCO3 UR-SCNC: 20.9 MMOL/L (ref 24–28)
HCO3 UR-SCNC: 25.5 MMOL/L (ref 24–28)
HCT VFR BLD AUTO: 37 % (ref 37–48.5)
HCT VFR BLD AUTO: 37.1 % (ref 37–48.5)
HGB BLD-MCNC: 10.1 G/DL (ref 12–16)
HGB BLD-MCNC: 10.8 G/DL (ref 12–16)
HGB UR QL STRIP: ABNORMAL
HYALINE CASTS UR QL AUTO: 0 /LPF
IMM GRANULOCYTES # BLD AUTO: 0.11 K/UL (ref 0–0.04)
IMM GRANULOCYTES # BLD AUTO: 0.36 K/UL (ref 0–0.04)
IMM GRANULOCYTES NFR BLD AUTO: 0.8 % (ref 0–0.5)
IMM GRANULOCYTES NFR BLD AUTO: 3.4 % (ref 0–0.5)
INR PPP: 1 (ref 0.8–1.2)
INR PPP: 1.1 (ref 0.8–1.2)
KETONES UR QL STRIP: NEGATIVE
LACTATE SERPL-SCNC: 3 MMOL/L (ref 0.5–2.2)
LEUKOCYTE ESTERASE UR QL STRIP: NEGATIVE
LIPASE SERPL-CCNC: 39 U/L (ref 4–60)
LYMPHOCYTES # BLD AUTO: 0.9 K/UL (ref 1–4.8)
LYMPHOCYTES # BLD AUTO: 6.6 K/UL (ref 1–4.8)
LYMPHOCYTES NFR BLD: 6.5 % (ref 18–48)
LYMPHOCYTES NFR BLD: 61.6 % (ref 18–48)
MAGNESIUM SERPL-MCNC: 2.6 MG/DL (ref 1.6–2.6)
MCH RBC QN AUTO: 28.3 PG (ref 27–31)
MCH RBC QN AUTO: 28.6 PG (ref 27–31)
MCHC RBC AUTO-ENTMCNC: 27.3 G/DL (ref 32–36)
MCHC RBC AUTO-ENTMCNC: 29.1 G/DL (ref 32–36)
MCV RBC AUTO: 105 FL (ref 82–98)
MCV RBC AUTO: 97 FL (ref 82–98)
MICROSCOPIC COMMENT: ABNORMAL
MODE: ABNORMAL
MONOCYTES # BLD AUTO: 0.6 K/UL (ref 0.3–1)
MONOCYTES # BLD AUTO: 1.4 K/UL (ref 0.3–1)
MONOCYTES NFR BLD: 10.5 % (ref 4–15)
MONOCYTES NFR BLD: 5.7 % (ref 4–15)
NEUTROPHILS # BLD AUTO: 11.2 K/UL (ref 1.8–7.7)
NEUTROPHILS # BLD AUTO: 2.8 K/UL (ref 1.8–7.7)
NEUTROPHILS NFR BLD: 26.4 % (ref 38–73)
NEUTROPHILS NFR BLD: 81.7 % (ref 38–73)
NITRITE UR QL STRIP: NEGATIVE
NRBC BLD-RTO: 0 /100 WBC
NRBC BLD-RTO: 0 /100 WBC
PCO2 BLDA: 41.6 MMHG (ref 35–45)
PCO2 BLDA: 58.7 MMHG (ref 35–45)
PCO2 BLDA: 69.8 MMHG (ref 35–45)
PEEP: 5
PH SMN: 6.97 [PH] (ref 7.35–7.45)
PH SMN: 7.16 [PH] (ref 7.35–7.45)
PH SMN: 7.39 [PH] (ref 7.35–7.45)
PH UR STRIP: 7 [PH] (ref 5–8)
PLATELET # BLD AUTO: 176 K/UL (ref 150–350)
PLATELET # BLD AUTO: 229 K/UL (ref 150–350)
PMV BLD AUTO: 10.7 FL (ref 9.2–12.9)
PMV BLD AUTO: 11.3 FL (ref 9.2–12.9)
PO2 BLDA: 23 MMHG (ref 40–60)
PO2 BLDA: 91 MMHG (ref 80–100)
PO2 BLDA: 92 MMHG (ref 40–60)
POC BE: -16 MMOL/L
POC BE: -8 MMOL/L
POC BE: 1 MMOL/L
POC SATURATED O2: 27 % (ref 95–100)
POC SATURATED O2: 90 % (ref 95–100)
POC SATURATED O2: 97 % (ref 95–100)
POC TCO2: 18 MMOL/L (ref 24–29)
POC TCO2: 23 MMOL/L (ref 24–29)
POC TCO2: 27 MMOL/L (ref 23–27)
POTASSIUM SERPL-SCNC: 4 MMOL/L (ref 3.5–5.1)
POTASSIUM SERPL-SCNC: 4.4 MMOL/L (ref 3.5–5.1)
PROCALCITONIN SERPL IA-MCNC: 0.02 NG/ML
PROT SERPL-MCNC: 5.4 G/DL (ref 6–8.4)
PROT SERPL-MCNC: 5.9 G/DL (ref 6–8.4)
PROT UR QL STRIP: ABNORMAL
PROTHROMBIN TIME: 10.7 SEC (ref 9–12.5)
PROTHROMBIN TIME: 11.4 SEC (ref 9–12.5)
PROVIDER CREDENTIALS: NORMAL
PROVIDER NOTIFIED: NORMAL
RBC # BLD AUTO: 3.53 M/UL (ref 4–5.4)
RBC # BLD AUTO: 3.82 M/UL (ref 4–5.4)
RBC #/AREA URNS AUTO: 15 /HPF (ref 0–4)
SAMPLE: ABNORMAL
SAMPLE: ABNORMAL
SAMPLE: NORMAL
SITE: ABNORMAL
SITE: ABNORMAL
SITE: NORMAL
SODIUM SERPL-SCNC: 142 MMOL/L (ref 136–145)
SODIUM SERPL-SCNC: 142 MMOL/L (ref 136–145)
SP GR UR STRIP: 1.01 (ref 1–1.03)
T4 FREE SERPL-MCNC: 0.67 NG/DL (ref 0.71–1.51)
TROPONIN I SERPL DL<=0.01 NG/ML-MCNC: 0.04 NG/ML (ref 0–0.03)
TSH SERPL DL<=0.005 MIU/L-ACNC: 5.29 UIU/ML (ref 0.4–4)
TSH SERPL DL<=0.005 MIU/L-ACNC: 5.29 UIU/ML (ref 0.4–4)
URN SPEC COLLECT METH UR: ABNORMAL
VERBAL RESULT READBACK PERFORMED: YES
VT: 450
WBC # BLD AUTO: 10.7 K/UL (ref 3.9–12.7)
WBC # BLD AUTO: 13.66 K/UL (ref 3.9–12.7)
WBC #/AREA URNS AUTO: 0 /HPF (ref 0–5)

## 2019-01-01 PROCEDURE — 25000003 PHARM REV CODE 250: Performed by: STUDENT IN AN ORGANIZED HEALTH CARE EDUCATION/TRAINING PROGRAM

## 2019-01-01 PROCEDURE — 96368 THER/DIAG CONCURRENT INF: CPT

## 2019-01-01 PROCEDURE — 93010 ELECTROCARDIOGRAM REPORT: CPT | Mod: ,,, | Performed by: INTERNAL MEDICINE

## 2019-01-01 PROCEDURE — 63600175 PHARM REV CODE 636 W HCPCS: Performed by: STUDENT IN AN ORGANIZED HEALTH CARE EDUCATION/TRAINING PROGRAM

## 2019-01-01 PROCEDURE — 93005 ELECTROCARDIOGRAM TRACING: CPT

## 2019-01-01 PROCEDURE — 83735 ASSAY OF MAGNESIUM: CPT

## 2019-01-01 PROCEDURE — 99291 CRITICAL CARE FIRST HOUR: CPT | Mod: ,,, | Performed by: EMERGENCY MEDICINE

## 2019-01-01 PROCEDURE — 99900035 HC TECH TIME PER 15 MIN (STAT)

## 2019-01-01 PROCEDURE — 84439 ASSAY OF FREE THYROXINE: CPT

## 2019-01-01 PROCEDURE — 99291 PR CRITICAL CARE, E/M 30-74 MINUTES: ICD-10-PCS | Mod: GW,,, | Performed by: NURSE PRACTITIONER

## 2019-01-01 PROCEDURE — 85730 THROMBOPLASTIN TIME PARTIAL: CPT

## 2019-01-01 PROCEDURE — 94002 VENT MGMT INPAT INIT DAY: CPT

## 2019-01-01 PROCEDURE — 63600175 PHARM REV CODE 636 W HCPCS: Performed by: NURSE PRACTITIONER

## 2019-01-01 PROCEDURE — 20000000 HC ICU ROOM

## 2019-01-01 PROCEDURE — 36556 INSERT NON-TUNNEL CV CATH: CPT

## 2019-01-01 PROCEDURE — 83605 ASSAY OF LACTIC ACID: CPT

## 2019-01-01 PROCEDURE — 94003 VENT MGMT INPAT SUBQ DAY: CPT

## 2019-01-01 PROCEDURE — 80053 COMPREHEN METABOLIC PANEL: CPT

## 2019-01-01 PROCEDURE — 84484 ASSAY OF TROPONIN QUANT: CPT

## 2019-01-01 PROCEDURE — 99291 CRITICAL CARE FIRST HOUR: CPT | Mod: GW,,, | Performed by: NURSE PRACTITIONER

## 2019-01-01 PROCEDURE — 82803 BLOOD GASES ANY COMBINATION: CPT

## 2019-01-01 PROCEDURE — 84443 ASSAY THYROID STIM HORMONE: CPT

## 2019-01-01 PROCEDURE — 85610 PROTHROMBIN TIME: CPT | Mod: 91

## 2019-01-01 PROCEDURE — 85610 PROTHROMBIN TIME: CPT

## 2019-01-01 PROCEDURE — 83880 ASSAY OF NATRIURETIC PEPTIDE: CPT

## 2019-01-01 PROCEDURE — 80053 COMPREHEN METABOLIC PANEL: CPT | Mod: 91

## 2019-01-01 PROCEDURE — 94761 N-INVAS EAR/PLS OXIMETRY MLT: CPT

## 2019-01-01 PROCEDURE — 81001 URINALYSIS AUTO W/SCOPE: CPT

## 2019-01-01 PROCEDURE — 96366 THER/PROPH/DIAG IV INF ADDON: CPT

## 2019-01-01 PROCEDURE — 27000221 HC OXYGEN, UP TO 24 HOURS

## 2019-01-01 PROCEDURE — 87040 BLOOD CULTURE FOR BACTERIA: CPT | Mod: 59

## 2019-01-01 PROCEDURE — 93010 EKG 12-LEAD: ICD-10-PCS | Mod: ,,, | Performed by: INTERNAL MEDICINE

## 2019-01-01 PROCEDURE — 99291 PR CRITICAL CARE, E/M 30-74 MINUTES: ICD-10-PCS | Mod: ,,, | Performed by: EMERGENCY MEDICINE

## 2019-01-01 PROCEDURE — 99291 CRITICAL CARE FIRST HOUR: CPT

## 2019-01-01 PROCEDURE — 86901 BLOOD TYPING SEROLOGIC RH(D): CPT

## 2019-01-01 PROCEDURE — 63600175 PHARM REV CODE 636 W HCPCS

## 2019-01-01 PROCEDURE — 36600 WITHDRAWAL OF ARTERIAL BLOOD: CPT

## 2019-01-01 PROCEDURE — 63600175 PHARM REV CODE 636 W HCPCS: Performed by: EMERGENCY MEDICINE

## 2019-01-01 PROCEDURE — 84145 PROCALCITONIN (PCT): CPT

## 2019-01-01 PROCEDURE — 96375 TX/PRO/DX INJ NEW DRUG ADDON: CPT

## 2019-01-01 PROCEDURE — 85025 COMPLETE CBC W/AUTO DIFF WBC: CPT | Mod: 91

## 2019-01-01 PROCEDURE — 83690 ASSAY OF LIPASE: CPT

## 2019-01-01 PROCEDURE — 36620 INSERTION CATHETER ARTERY: CPT

## 2019-01-01 PROCEDURE — 99900026 HC AIRWAY MAINTENANCE (STAT)

## 2019-01-01 PROCEDURE — 96365 THER/PROPH/DIAG IV INF INIT: CPT

## 2019-01-01 PROCEDURE — 51702 INSERT TEMP BLADDER CATH: CPT

## 2019-01-01 RX ORDER — LORAZEPAM 2 MG/ML
2 INJECTION INTRAMUSCULAR EVERY 10 MIN PRN
Status: DISCONTINUED | OUTPATIENT
Start: 2019-01-01 | End: 2019-05-28 | Stop reason: HOSPADM

## 2019-01-01 RX ORDER — FENTANYL CITRATE-0.9 % NACL/PF 10 MCG/ML
25 PLASTIC BAG, INJECTION (ML) INTRAVENOUS CONTINUOUS
Status: DISCONTINUED | OUTPATIENT
Start: 2019-01-01 | End: 2019-01-01

## 2019-01-01 RX ORDER — SODIUM CHLORIDE 0.9 % (FLUSH) 0.9 %
10 SYRINGE (ML) INJECTION
Status: DISCONTINUED | OUTPATIENT
Start: 2019-01-01 | End: 2019-05-28 | Stop reason: HOSPADM

## 2019-01-01 RX ORDER — CEFEPIME HYDROCHLORIDE 2 G/1
2 INJECTION, POWDER, FOR SOLUTION INTRAVENOUS
Status: COMPLETED | OUTPATIENT
Start: 2019-01-01 | End: 2019-01-01

## 2019-01-01 RX ORDER — NOREPINEPHRINE BITARTRATE/D5W 4MG/250ML
0.05 PLASTIC BAG, INJECTION (ML) INTRAVENOUS CONTINUOUS
Status: DISCONTINUED | OUTPATIENT
Start: 2019-01-01 | End: 2019-01-01

## 2019-01-01 RX ORDER — HEPARIN SODIUM,PORCINE/D5W 25000/250
18 INTRAVENOUS SOLUTION INTRAVENOUS CONTINUOUS
Status: DISCONTINUED | OUTPATIENT
Start: 2019-01-01 | End: 2019-01-01

## 2019-01-01 RX ORDER — GLYCOPYRROLATE 0.2 MG/ML
0.2 INJECTION INTRAMUSCULAR; INTRAVENOUS ONCE
Status: COMPLETED | OUTPATIENT
Start: 2019-01-01 | End: 2019-01-01

## 2019-01-01 RX ORDER — FENTANYL CITRATE-0.9 % NACL/PF 10 MCG/ML
25 PLASTIC BAG, INJECTION (ML) INTRAVENOUS CONTINUOUS
Status: DISCONTINUED | OUTPATIENT
Start: 2019-01-01 | End: 2019-05-28 | Stop reason: HOSPADM

## 2019-01-01 RX ORDER — VANCOMYCIN HCL IN 5 % DEXTROSE 1G/250ML
20 PLASTIC BAG, INJECTION (ML) INTRAVENOUS
Status: COMPLETED | OUTPATIENT
Start: 2019-01-01 | End: 2019-01-01

## 2019-01-01 RX ORDER — IPRATROPIUM BROMIDE AND ALBUTEROL SULFATE 2.5; .5 MG/3ML; MG/3ML
3 SOLUTION RESPIRATORY (INHALATION) EVERY 4 HOURS
Status: DISCONTINUED | OUTPATIENT
Start: 2019-01-01 | End: 2019-01-01

## 2019-01-01 RX ORDER — MORPHINE SULFATE 2 MG/ML
4 INJECTION, SOLUTION INTRAMUSCULAR; INTRAVENOUS ONCE
Status: COMPLETED | OUTPATIENT
Start: 2019-01-01 | End: 2019-01-01

## 2019-01-01 RX ORDER — GLYCOPYRROLATE 0.2 MG/ML
0.1 INJECTION INTRAMUSCULAR; INTRAVENOUS ONCE
Status: DISCONTINUED | OUTPATIENT
Start: 2019-01-01 | End: 2019-01-01

## 2019-01-01 RX ORDER — LORAZEPAM 2 MG/ML
INJECTION INTRAMUSCULAR
Status: COMPLETED
Start: 2019-01-01 | End: 2019-01-01

## 2019-01-01 RX ORDER — LORAZEPAM 2 MG/ML
2 INJECTION INTRAMUSCULAR EVERY 10 MIN PRN
Status: DISCONTINUED | OUTPATIENT
Start: 2019-01-01 | End: 2019-01-01

## 2019-01-01 RX ADMIN — GLYCOPYRROLATE 0.2 MG: 0.2 INJECTION INTRAMUSCULAR; INTRAVENOUS at 09:05

## 2019-01-01 RX ADMIN — SODIUM BICARBONATE: 84 INJECTION, SOLUTION INTRAVENOUS at 04:05

## 2019-01-01 RX ADMIN — LORAZEPAM 2 MG: 2 INJECTION INTRAMUSCULAR at 09:05

## 2019-01-01 RX ADMIN — Medication 25 MCG/HR: at 02:05

## 2019-01-01 RX ADMIN — HYDROCORTISONE SODIUM SUCCINATE 100 MG: 100 INJECTION, POWDER, FOR SOLUTION INTRAMUSCULAR; INTRAVENOUS at 02:05

## 2019-01-01 RX ADMIN — LORAZEPAM 2 MG: 2 INJECTION INTRAMUSCULAR; INTRAVENOUS at 09:05

## 2019-01-01 RX ADMIN — MORPHINE SULFATE 4 MG: 2 INJECTION, SOLUTION INTRAMUSCULAR; INTRAVENOUS at 10:05

## 2019-01-01 RX ADMIN — Medication 150 MCG/HR: at 09:05

## 2019-01-01 RX ADMIN — VANCOMYCIN HYDROCHLORIDE 1000 MG: 1 INJECTION, POWDER, LYOPHILIZED, FOR SOLUTION INTRAVENOUS at 03:05

## 2019-01-01 RX ADMIN — MORPHINE SULFATE 4 MG: 2 INJECTION, SOLUTION INTRAMUSCULAR; INTRAVENOUS at 09:05

## 2019-01-01 RX ADMIN — CEFEPIME 2 G: 2 INJECTION, POWDER, FOR SOLUTION INTRAVENOUS at 04:05

## 2019-05-27 PROBLEM — J96.01 ACUTE RESPIRATORY FAILURE WITH HYPOXIA AND HYPERCAPNIA: Status: ACTIVE | Noted: 2019-01-01

## 2019-05-27 PROBLEM — J96.90 RESPIRATORY FAILURE: Status: ACTIVE | Noted: 2019-01-01

## 2019-05-27 PROBLEM — E87.29 INCREASED ANION GAP METABOLIC ACIDOSIS: Status: ACTIVE | Noted: 2019-01-01

## 2019-05-27 PROBLEM — I46.9 CARDIAC ARREST: Status: ACTIVE | Noted: 2019-01-01

## 2019-05-27 PROBLEM — Z98.61 CAD S/P PERCUTANEOUS CORONARY ANGIOPLASTY: Chronic | Status: ACTIVE | Noted: 2019-01-01

## 2019-05-27 PROBLEM — J96.02 ACUTE RESPIRATORY FAILURE WITH HYPOXIA AND HYPERCAPNIA: Status: ACTIVE | Noted: 2019-01-01

## 2019-05-27 PROBLEM — I25.10 CAD S/P PERCUTANEOUS CORONARY ANGIOPLASTY: Chronic | Status: ACTIVE | Noted: 2019-01-01

## 2019-05-27 NOTE — ASSESSMENT & PLAN NOTE
Unknown reason for arrest, could be acute resp failure vs cardiac? Vs mucus plug     CT head: No acute infarct or acute hemorrhage or mass or fracture   Pt had been immobile for the past year    High risk of DVT/PE,    Acidosis at admission, family not agreeable to dialysis for temporarily fix her acidosis, will hold on CTA PE to avoid further damage to her kidneys, instead will initiate anticoagulation once CT head is negative for bleed;     At the ED, pt afebrile, WBC WNL, hgb 10, procal wnl, troponin 0.04.    vanco and cefepime given at ED   Follow up cultures+    EKG showed Sinus tachycardia vs  Atrial flutter with 2 to 1 block   On levophed and given iv steroids for concern for septic shock      CVC placed 5/27   F/u TTE and trop

## 2019-05-27 NOTE — ED PROVIDER NOTES
"Encounter Date: 5/27/2019    SCRIBE #1 NOTE: I, Mayra Ramirez, am scribing for, and in the presence of,  Dr. Vasquez. I have scribed the entire note.       History     Chief Complaint   Patient presents with    Cardiac Arrest     The patient is a 78 y.o. female with past medical history of pulmonary artery HTN, CAD, COPD presenting per EMS in cardiac arrest. The patient is bedridden and living at home. Her daughter reports that the patient had been feeling short of breath earlier today, but otherwise seemed at baseline. The daughter got out of the shower to find the patient seized up and saying she couldn't breathe, that she felt like mucus was blocking her airway. The daughter endorsed that the patient had been "turning blue".   At 1:04 pm the EMS was called. They arrived at 1:14 pm, and report that the patient was unresponsive and in asystole, they began advanced cardiac life support. Per EMS, the patient received a total of 6 epi's, 1 bicarb, 2 mg atropine and 2 mg narcan. Her rhythm went into ventricular fibrillation, the EMS cardioverted and her rhythm changed to supraventricular tachycardia, where it remained despite 6 mg adenosine. EMS cardioverted the patient 2 more times, achieving return of spontaneous circulation at 1:32 pm. Patient was intubated in the field.     The history is provided by the EMS personnel, a relative and medical records. The history is limited by the condition of the patient.     Review of patient's allergies indicates:   Allergen Reactions    Amlodipine Other (See Comments)     dizzy    Sulfa (sulfonamide antibiotics)      Unknown reaction, occurred as a child     Past Medical History:   Diagnosis Date    Anticoagulant long-term use     Arthritis     Basal cell cancer     to nose    Central retinal vein occlusion of right eye 1/13/2017    COPD (chronic obstructive pulmonary disease)     Coronary artery disease     Emphysema lung     Hypertension     Old MI (myocardial " infarction) 9/16/2014    Osteoporosis     Osteoporosis, senile     PONV (postoperative nausea and vomiting)     Pulmonary artery hypertension 1/23/2015    Spinal stenosis     STEMI (ST elevation myocardial infarction)      Past Surgical History:   Procedure Laterality Date    ABDOMINAL SURGERY      APPENDECTOMY      BACK SURGERY      CATHETERIZATION, HEART, LEFT Left 12/9/2012    Performed by [facility]ksandar Cuevas MD at [facility] CATH LAB    Division and Inset  7/15/2014    Paramedian Forehead Flap repair    FRACTURE SURGERY      both hips last on right late 2015    HYSTERECTOMY      JOINT REPLACEMENT      REPAIR-MOHS DEFECT Bilateral 6/20/2014    Performed by Ulysses Beaulieu III, MD at [facility] OR [facility]    REVISION-FLAP FOREHEAD Bilateral 7/15/2014    Performed by Ulysses Beaulieu III, MD at [facility] OR [facility]     Family History   Problem Relation Age of Onset    No Known Problems Mother     No Known Problems Father     No Known Problems Sister     No Known Problems Brother     No Known Problems Maternal Aunt     No Known Problems Maternal Uncle     No Known Problems Paternal Aunt     No Known Problems Paternal Uncle     No Known Problems Maternal Grandmother     No Known Problems Maternal Grandfather     No Known Problems Paternal Grandmother     No Known Problems Paternal Grandfather     Amblyopia Neg Hx     Blindness Neg Hx     Cancer Neg Hx     Cataracts Neg Hx     Diabetes Neg Hx     Glaucoma Neg Hx     Hypertension Neg Hx     Macular degeneration Neg Hx     Retinal detachment Neg Hx     Strabismus Neg Hx     Stroke Neg Hx     Thyroid disease Neg Hx      Social History     Tobacco Use    Smoking status: Former Smoker     Packs/day: 0.25     Years: 50.00     Pack years: 12.50    Smokeless tobacco: Never Used   Substance Use Topics    Alcohol use: No    Drug use: No     Review of Systems   Unable to perform ROS: Patient unresponsive   Respiratory: Positive for shortness of breath.         Physical Exam     Initial Vitals   BP Pulse Resp Temp SpO2   05/27/19 1400 05/27/19 1347 05/27/19 1347 -- 05/27/19 1347   (!) 194/76 (!) 150 (!) 27  (!) 93 %      MAP       --                Physical Exam    Nursing note and vitals reviewed.  Constitutional: She appears well-developed and well-nourished.   Patient is ill appearing and cool to the touch.    HENT:   Head: Normocephalic and atraumatic.   Positive gag reflex. ET tube at 25 cm with bright red blood in    Eyes: Conjunctivae are normal.   Pupils are sluggish.   Neck: Normal range of motion.   Cardiovascular:   Tachycardic; Hypotensive.   Pulmonary/Chest: She has rhonchi.   Abdominal: Soft. She exhibits no distension.   Musculoskeletal: Normal range of motion.   Neurological:   Unresponsive.    Skin: Skin is dry.   Mottling on abdomen.          ED Course   Procedures  Labs Reviewed   CBC W/ AUTO DIFFERENTIAL - Abnormal; Notable for the following components:       Result Value    RBC 3.53 (*)     Hemoglobin 10.1 (*)     Mean Corpuscular Volume 105 (*)     Mean Corpuscular Hemoglobin Conc 27.3 (*)     RDW 14.9 (*)     Immature Granulocytes 3.4 (*)     Immature Grans (Abs) 0.36 (*)     Lymph # 6.6 (*)     Gran% 26.4 (*)     Lymph% 61.6 (*)     All other components within normal limits    Narrative:     no gold - red used   COMPREHENSIVE METABOLIC PANEL - Abnormal; Notable for the following components:    CO2 13 (*)     Glucose 246 (*)     Calcium 8.4 (*)     Total Protein 5.4 (*)     Albumin 2.7 (*)     AST 68 (*)     Anion Gap 23 (*)     All other components within normal limits    Narrative:     no gold - red used   B-TYPE NATRIURETIC PEPTIDE - Abnormal; Notable for the following components:     (*)     All other components within normal limits    Narrative:     no gold - red used   TROPONIN I - Abnormal; Notable for the following components:    Troponin I 0.044 (*)     All other components within normal limits    Narrative:     no gold - red  used   TSH - Abnormal; Notable for the following components:    TSH 5.291 (*)     All other components within normal limits    Narrative:     no gold - red used   URINALYSIS, REFLEX TO URINE CULTURE - Abnormal; Notable for the following components:    Appearance, UA Hazy (*)     Protein, UA 2+ (*)     Glucose, UA 2+ (*)     Occult Blood UA 2+ (*)     All other components within normal limits    Narrative:     Preferred Collection Type->Urine, Clean Catch  yellow and grey   TSH - Abnormal; Notable for the following components:    TSH 5.291 (*)     All other components within normal limits    Narrative:     no gold - red used   URINALYSIS MICROSCOPIC - Abnormal; Notable for the following components:    RBC, UA 15 (*)     Bacteria Moderate (*)     All other components within normal limits    Narrative:     Preferred Collection Type->Urine, Clean Catch  yellow and grey   T4, FREE - Abnormal; Notable for the following components:    Free T4 0.67 (*)     All other components within normal limits    Narrative:     no gold - red used   ISTAT PROCEDURE - Abnormal; Notable for the following components:    POC PH 6.971 (*)     POC PCO2 69.8 (*)     POC PO2 92 (*)     POC HCO3 16.1 (*)     POC SATURATED O2 90 (*)     POC TCO2 18 (*)     All other components within normal limits   ISTAT PROCEDURE - Abnormal; Notable for the following components:    POC PH 7.159 (*)     POC PCO2 58.7 (*)     POC PO2 23 (*)     POC HCO3 20.9 (*)     POC SATURATED O2 27 (*)     POC TCO2 23 (*)     All other components within normal limits   CULTURE, BLOOD   CULTURE, BLOOD   MAGNESIUM    Narrative:     no gold - red used   LIPASE    Narrative:     no gold - red used   PROTIME-INR    Narrative:     no gold - red used   PROCALCITONIN    Narrative:     no gold - red used   TSH   LACTIC ACID, PLASMA   CBC W/ AUTO DIFFERENTIAL   LACTIC ACID, PLASMA   TYPE & SCREEN     EKG Readings: (Independently Interpreted)   Rhythm: Sinus Tachycardia. Heart Rate: 149.  Axis: Normal.   No specific ST changes.     ECG Results          EKG 12-lead (Final result)  Result time 05/27/19 15:41:17    Final result by Interface, Lab In [facility] (05/27/19 15:41:17)                 Narrative:    Test Reason : I46.9,    Vent. Rate : 149 BPM     Atrial Rate : 149 BPM     P-R Int : 158 ms          QRS Dur : 090 ms      QT Int : 324 ms       P-R-T Axes : 036 -14 056 degrees     QTc Int : 510 ms    Sinus tachycardia vs  Atrial flutter with 2 to 1 block  Inferior infarct ,age undetermined  Nonspecific T wave abnormality    Abnormal ECG  Confirmed by VINICIUS PRESLEY MD ([id number]) on 5/27/2019 3:41:07 PM    Referred By: [email or web address]   SELF           Confirmed By:VINICIUS PRESLEY MD                            Imaging Results          CT Head Without Contrast (Final result)  Result time 05/27/19 16:09:18    Final result by Jorden Sprague MD (05/27/19 16:09:18)                 Impression:      No acute infarct or acute hemorrhage or mass or fracture      Electronically signed by: Jorden Sprague  Date:    05/27/2019  Time:    16:09             Narrative:    EXAMINATION:  CT HEAD WITHOUT CONTRAST    CLINICAL HISTORY:  s/p cardiac arrest;    TECHNIQUE:  Low dose axial images were obtained through the head.  Coronal and sagittal reformations were also performed. Contrast was not administered.    COMPARISON:  None.    FINDINGS:  The overall gray-white interface is preserved.  The overall hemispherical morphology appears normal.  There is no midline shift or mass effect.  There is mild diffuse enlargement of the ventricles and sulci which remain poor portion it and size.  The basal cisterns appear normal.    There is moderate patchy hypodense areas of the periventricular and deep white matter likely age-appropriate microvascular change.    No acute hemorrhage or acute infarct.  No subdural collection.  The corpus callosum morphology appears normal.  The pituitary appears diminutive perhaps normal for age.    There is  retention cyst in the right maxillary sinus incidentally noted.  The visualized mastoid sinuses and middle ears appear normal.  No skull fracture.  No overlying soft tissue swelling found.  The visualized orbits and globes and lenses appear grossly normal.                               X-Ray Chest 1 View (Final result)  Result time 05/27/19 14:25:33    Final result by Jorden Sprague MD (05/27/19 14:25:33)                 Impression:      Mild atelectasis at the right base.  Mild hazy streaky opacity in the mid lungs could indicate early lung disease suggest pneumonia.  No definite interstitial edema.      Electronically signed by: Jorden Sprague  Date:    05/27/2019  Time:    14:25             Narrative:    EXAMINATION:  XR CHEST 1 VIEW    CLINICAL HISTORY:  Shortness of breath    TECHNIQUE:  Single frontal view of the chest was performed.    COMPARISON:  06/12/2014 chest film    FINDINGS:  Single frontal portable view at 14:07.    The endotracheal tube projects in good position with tip projected about 5.3 cm from the yeni.  There appears to be a nasogastric tube extending to the stomach fundus and beyond the film with proximal port at about the GE junction.  This probably should be advanced about 5-10 cm.  There is overlying leads and pacer patches and catheters and appliances    The heart is normal size.  There is mild platelike atelectasis at the right base.  There is mild hazy streaky opacities of the mid lungs which are new.  This could indicate early infection.  No pneumothorax or pleural effusion or interstitial edema.  There is scattered reticular opacities probably senescent changes or scarring.  No abdominal free air.  There is mild elevation the right hemidiaphragm noted.                                 Medical Decision Making:   History:   I obtained history from: someone other than patient.  Old Medical Records: I decided to obtain old medical records.  Independently Interpreted Test(s):   I have  ordered and independently interpreted EKG Reading(s) - see prior notes  Clinical Tests:   Lab Tests: Ordered and Reviewed  Radiological Study: Ordered and Reviewed  Medical Tests: Ordered and Reviewed  ED Management:  Emergent evaluation of respiratory failure and cardiac arrest. Resuscitated in field with ROSC.  Intubated in field.  Now has bright red blood in the ET tube.  Would consider traumatic intubation, pulmonary hemorrhage, less likely pulmonary edema based on its appearance.  Blood pressure requiring Levophed drip.  Initial VBG with acidosis of 6.9.  CO2 is elevated at 69.  ICU consulted.  Other:   I have discussed this case with another health care provider.            Scribe Attestation:   Scribe #1: I performed the above scribed service and the documentation accurately describes the services I performed. I attest to the accuracy of the note.    Attending Attestation:         Attending Critical Care:   Critical Care Times:   Direct Patient Care (initial evaluation, reassessments, and time considering the case)................................................................20 minutes.   Additional History from reviewing old medical records or taking additional history from the family, EMS, PCP, etc.......................10 minutes.   Ordering, Reviewing, and Interpreting Diagnostic Studies...............................................................................................................5 minutes.   Documentation..................................................................................................................................................................................5 minutes.   Consultation with other Physicians. .................................................................................................................................................5 minutes.   Consultation with the patient's family directly relating to the patient's condition, care, and DNR status  (when patient unable)......5 minutes.   ==============================================================  · Total Critical Care Time - exclusive of procedural time: 50 minutes.  ==============================================================  Critical care was necessary to treat or prevent imminent or life-threatening deterioration of the following conditions: respiratory failure, cardiac arrhythmia and cardiac arrest.       Attending ED Notes:   Labs reviewed.  UTI noted. Anion gap acidosis noted. Patient's mental status is improving.  S she will be admitted to the ICU for further management             Clinical Impression:       ICD-10-CM ICD-9-CM   1. Respiratory failure, unspecified chronicity, unspecified whether with hypoxia or hypercapnia J96.90 518.81   2. Shortness of breath R06.02 786.05   3. Cardiac arrest I46.9 427.5                                Bennie Vasquez MD  05/27/19 2918

## 2019-05-27 NOTE — ASSESSMENT & PLAN NOTE
Pt had been immobile for the past year, with SOB and acute resp failure   Highly concern of DVT/PE,    Also Acidosis at admission, family not agreeable to dialysis for temporarily fix her acidosis, will hold on CTA PE to avoid further damage to her kidneys, instead will initiate anticoagulation once CT head is negative for bleed;    Could also has COPD exacerbation vs PNA vs Aspiration vs mucus plug   Mechanical ventilation adjusted to assist CO2 exhalation;   Bicarb drip for correcting acidosis;   Follow up with ABG/VBG

## 2019-05-27 NOTE — ASSESSMENT & PLAN NOTE
Pt had been immobile for the past year, with SOB and acute resp failure   Highly concern of DVT/PE,    Also acidosis at admission, planning on dialysis for temporarily fix her acidosis, will hold on CTA PE to avoid further damage to her kidneys, instead will initiate anticoagulation once CT head is negative for bleed;    Mechanical ventilation adjusted to assist CO2 exhalation;

## 2019-05-27 NOTE — PROCEDURES
"Neli Grullon is a 78 y.o. female patient.    Pulse: 104 (19 184)  Resp: (!) 27 (19 1347)  BP: (!) 111/56 (19)  SpO2: (!) 94 % (19)  Weight: 47.6 kg (105 lb) (19 1406)  Height: 5' 2" (157.5 cm) (19 1814)       Arterial Line  Date/Time: 2019 6:51 PM  Performed by: Gwen Mulligan MD  Authorized by: Gwen Mulligan MD   Assisting provider: Rosamaria Kirk MD  Consent Done: Yes  Risks and benefits: risks, benefits and alternatives were discussed  Consent given by: power of   Patient identity confirmed:  and name  Preparation: Patient was prepped and draped in the usual sterile fashion.  Indications: multiple ABGs, respiratory failure and hemodynamic monitoring  Location: right radial  Anesthesia: see MAR for details  Patient sedated: yes  Sedatives: fentanyl  Analgesia: fentanyl and see MAR for details  Needle gauge: 20  Seldinger technique: Seldinger technique used  Cutdown: cutdown required  Number of attempts: 2  Comments: Small vessel caliber, unable to get it after 2 attempts.          Gwen Mulligan  2019  "

## 2019-05-27 NOTE — ASSESSMENT & PLAN NOTE
In 2012, she had STEMI/cardiogenic shock and underwent PCI of RCA ;    Hold ASA and GDMT for now   Pending repeat Echo  Tele monitor

## 2019-05-27 NOTE — ED TRIAGE NOTES
"Neli Grullon, a 78 y.o. female presents to the ED via  EMS with CC cardiac arrest. EMS reports called at 13:04 with c/o from family that patient was "turning blue", EMS arrived on scene @1314 and patient was unresponsive and in asystole, ACLS started. Pt received total of 6 epis, 1 bicarb, 2mg and 2mg narcan per EMS. Rhythm went into vfib, EMS shocked @360J, rhythm then changed to SVT, 6mg adenosine given, pt remained in SVT, EMS cardioverted x2 @ 50J #1 and 70J #2, achieved ROSC @1332.  Patient arrived to ED intubated with 7.0 ETT 25@lip per EMS with manual ventilations in progress, bounding carotid pulses noted, no responses noted with painful stimuli. Dr Vasquez at bedside, ED RT at bedside. Patient connected to continuous cardiac monitor, cont pulse ox, auto BP cuff and zole. Levophed started on arrival @1354 0.05mg/kg/min per Dr Vasquez verbal order with read back. Levophed paused at 1400 s/t pt BP hypertensive.  "

## 2019-05-27 NOTE — ASSESSMENT & PLAN NOTE
AGMA, also respiratory acidosis    Continue vent support w VC, rate adjusted for hypercapnea   F/u ABG  Bicarb drip  Continue to follow labs    BMP  Lab Results   Component Value Date     05/27/2019    K 4.0 05/27/2019     05/27/2019    CO2 13 (L) 05/27/2019    BUN 12 05/27/2019    CREATININE 0.8 05/27/2019    CALCIUM 8.4 (L) 05/27/2019    ANIONGAP 23 (H) 05/27/2019    ESTGFRAFRICA >60.0 05/27/2019    EGFRNONAA >60.0 05/27/2019

## 2019-05-27 NOTE — SUBJECTIVE & OBJECTIVE
Past Medical History:   Diagnosis Date    Anticoagulant long-term use     Arthritis     Basal cell cancer     to nose    Central retinal vein occlusion of right eye 1/13/2017    COPD (chronic obstructive pulmonary disease)     Coronary artery disease     Emphysema lung     Hypertension     Old MI (myocardial infarction) 9/16/2014    Osteoporosis     Osteoporosis, senile     PONV (postoperative nausea and vomiting)     Pulmonary artery hypertension 1/23/2015    Spinal stenosis     STEMI (ST elevation myocardial infarction)        Past Surgical History:   Procedure Laterality Date    ABDOMINAL SURGERY      APPENDECTOMY      BACK SURGERY      CATHETERIZATION, HEART, LEFT Left 12/9/2012    Performed by [facility]ar C[facility]vas [facility] at [facility] CATH LAB    Division and Inset  7/15/2014    Paramedian Forehead Flap repair    FRACTURE SURGERY      both hips last on right late 2015    HYSTERECTOMY      JOINT REPLACEMENT      REPAIR-MOHS DEFECT Bilateral 6/20/2014    Performed by Ulysses Beualieu III, MD at [facility] OR [facility]    REVISION-FLAP FOREHEAD Bilateral 7/15/2014    Performed by Ulysses Beaulieu III, MD at [facility]       Review of patient's allergies indicates:   Allergen Reactions    Amlodipine Other (See Comments)     dizzy    Sulfa (sulfonamide antibiotics)      Unknown reaction, occurred as a child       Family History     Problem Relation (Age of Onset)    No Known Problems Mother, Father, Sister, Brother, Maternal Aunt, Maternal Uncle, Paternal Aunt, Paternal Uncle, Maternal Grandmother, Maternal Grandfather, Paternal Grandmother, Paternal Grandfather        Tobacco Use    Smoking status: Former Smoker     Packs/day: 0.25     Years: 50.00     Pack years: 12.50    Smokeless tobacco: Never Used   Substance and Sexual Activity    Alcohol use: No    Drug use: No    Sexual activity: Not on file      Review of Systems   Unable to perform ROS: Intubated     Objective:     Vital Signs (Most  Recent):  Pulse: (!) 131 (05/27/19 1447)  Resp: (!) 27 (05/27/19 1347)  BP: 108/68 (05/27/19 1447)  SpO2: (!) 94 % (05/27/19 1447) Vital Signs (24h Range):  Pulse:  [131-150] 131  Resp:  [27] 27  SpO2:  [93 %-94 %] 94 %  BP: ()/(53-77) 108/68   Weight: 47.6 kg (105 lb)  Body mass index is 19.2 kg/m².    No intake or output data in the 24 hours ending 05/27/19 1519    Physical Exam   Constitutional:   Intubated and sedated   HENT:   Mouth/Throat: No oropharyngeal exudate.   Eyes: Conjunctivae are normal. No scleral icterus.   Cardiovascular:   Tachycardia w HR 130s   Pulmonary/Chest:   intubated on the vent   Abdominal: Soft. She exhibits no distension.   Musculoskeletal: She exhibits no edema or deformity.   Neurological:   Sedated, no response to pain   Skin: Skin is warm and dry. Capillary refill takes less than 2 seconds.       Vents:  Ventilator Initiated: Yes (05/27/19 1430)  Lines/Drains/Airways     Drain                 NG/OG Tube 05/27/19 1409 [place] sump;orogastric 18 Fr. Right mouth less than 1 day         Urethral Catheter 05/27/19 1428 [place] 16 Fr. less than 1 day          Peripheral Intravenous Line                 Peripheral IV - Single Lumen 05/27/19 1347 18 G Left Forearm less than 1 day         Peripheral IV - Single Lumen 05/27/19 1350 20 G Left Forearm less than 1 day         Peripheral IV - Single Lumen 05/27/19 1400 20 G Right Hand less than 1 day              Significant Labs:    CBC/Anemia Profile:  Recent Labs   Lab 05/27/19  1410   WBC 10.70   HGB 10.1*   HCT 37.0      *   RDW 14.9*        Chemistries:  Recent Labs   Lab 05/27/19  1410      K 4.0      CO2 13*   BUN 12   CREATININE 0.8   CALCIUM 8.4*   ALBUMIN 2.7*   PROT 5.4*   BILITOT 0.2   ALKPHOS 78   ALT 26   AST 68*   MG 2.6       All pertinent labs within the past 24 hours have been reviewed.    Significant Imaging: I have reviewed all pertinent imaging results/findings within the past 24 hours.

## 2019-05-27 NOTE — PROCEDURES
"Neli Grullon is a 78 y.o. female patient.    Pulse: 107 (05/27/19 1821)  Resp: (!) 27 (05/27/19 1347)  BP: (!) 120/50 (05/27/19 1821)  SpO2: (!) 94 % (05/27/19 1821)  Weight: 47.6 kg (105 lb) (05/27/19 1406)  Height: 5' 2" (157.5 cm) (05/27/19 1814)       Central Line  Date/Time: 5/27/2019 6:40 PM  Performed by: Gwen Mulligan MD  Assisting provider: Rosamaria Kirk MD  Consent Done: Yes  Time out: Immediately prior to procedure a "time out" was called to verify the correct patient, procedure, equipment, support staff and site/side marked as required.  Indications: med administration and hemodynamic monitoring  Anesthesia: general anesthesia  Preparation: skin prepped with ChloraPrep  Skin prep agent dried: skin prep agent completely dried prior to procedure  Hand hygiene: hand hygiene performed prior to central venous catheter insertion  Location details: right internal jugular  Catheter type: triple lumen  Catheter size: 7 Fr  Ultrasound guidance: yes  Vessel Caliber: medium, patent, compressibility normal  Needle advanced into vessel with real time Ultrasound guidance.  Manometry: Yes  Number of attempts: 2  Assessment: placement verified by x-ray  Post-procedure: line sutured,  chlorhexidine patch,  sterile dressing applied and blood return through all ports  Complications: No          Gwen Mulligan  5/27/2019  "

## 2019-05-27 NOTE — ASSESSMENT & PLAN NOTE
Pt had been immobile for the past year, with SOB and acute resp failure   Highly concern of DVT/PE,    Also acidosis at admission, planning on dialysis for temporarily fix her acidosis, will hold on CTA PE to avoid further damage to her kidneys, instead will initiate anticoagulation once CT head is negative for bleed;    Could also has COPD exacerbation vs PNA vs Aspiration vs mucus plug   Mechanical ventilation adjusted to assist CO2 exhalation;   Bicarb drip for correcting acidosis;   Follow up with ABG/VBG

## 2019-05-27 NOTE — H&P
[facility]  Critical Care Medicine  History & Physical    Patient Name: Neli Grullon  MRN: [id number]  Admission Date: 5/27/2019  Hospital Length of Stay: 0 days  Code Status: Partial Code  Attending Physician: Deonte Nettles*   Primary Care Provider: Dung Gardiner MD   Principal Problem: Cardiac arrest    Subjective:     HPI:  79 yo F w PMH of HTN, CAD, COPD on 3L NC at home, pulmonary hypertension,   CAD with prior MI?, HFpEF, sent to the ED due to cardiac arrest. Pt's history mainly from epic records and ER documentation/ER records. Pt was having some SOB earlier this morning was coughing. EMS was called later as pt breathing difficulty is getting worse and when EMS arrived, pt was found to be unresponsive and in asystole. ACLS was started, per EMS, the patient received a total of 6 epi's, 1 bicarb, 2 mg atropine and 2 mg narcan. Her rhythm went into ventricular fibrillation, the EMS cardioverted and her rhythm changed to supraventricular tachycardia, where it remained despite 6 mg adenosine. EMS cardioverted the patient 2 more times, achieving return of spontaneous circulation at 1:32 pm. Patient was intubated when she arrived at the ED, with a large amount of blood in her tubing. Bounding carotid pulses were noted. She does not respond to painful stimuli.     Hospital/ICU Course:  No notes on file     No new subjective & objective note has been filed under this hospital service since the last note was generated.    Assessment/Plan:     Pulmonary  Respiratory failure  Pt had been immobile for the past year, with SOB and acute resp failure   Highly concern of DVT/PE,    Also acidosis at admission, planning on dialysis for temporarily fix her acidosis, will hold on CTA PE to avoid further damage to her kidneys, instead will initiate anticoagulation once CT head is negative for bleed;    Mechanical ventilation adjusted to assist CO2 exhalation;      COPD (chronic obstructive  pulmonary disease)  Given steroids in the ER    Intubated  Wean vent as tolerated    Chronic respiratory failure with hypoxia  COPD  Uses 3L NC at home    Acute respiratory failure with hypoxia and hypercapnia  Pt had been immobile for the past year, with SOB and acute resp failure   Highly concern of DVT/PE,    Also Acidosis at admission, family not agreeable to dialysis for temporarily fix her acidosis, will hold on CTA PE to avoid further damage to her kidneys, instead will initiate anticoagulation once CT head is negative for bleed;    Could also has COPD exacerbation vs PNA vs Aspiration vs mucus plug   Mechanical ventilation adjusted to assist CO2 exhalation;   Bicarb drip for correcting acidosis;   Follow up with ABG/VBG    Cardiac/Vascular  * Cardiac arrest  Unknown reason for arrest, could be acute resp failure vs cardiac? Vs mucus plug     CT head: No acute infarct or acute hemorrhage or mass or fracture   Pt had been immobile for the past year    High risk of DVT/PE,    Acidosis at admission, family not agreeable to dialysis for temporarily fix her acidosis, will hold on CTA PE to avoid further damage to her kidneys, instead will initiate anticoagulation once CT head is negative for bleed;     At the ED, pt afebrile, WBC WNL, hgb 10, procal wnl, troponin 0.04.    vanco and cefepime given at ED   Follow up cultures+    EKG showed Sinus tachycardia vs  Atrial flutter with 2 to 1 block   On levophed and given iv steroids for concern for septic shock      CVC placed 5/27   F/u TTE and trop         CAD S/P percutaneous coronary angioplasty  In 2012, she had STEMI/cardiogenic shock and underwent PCI of RCA ;    Hold ASA and GDMT for now   Pending repeat Echo  Tele monitor    Renal/  Increased anion gap metabolic acidosis  AGMA, also respiratory acidosis    Continue vent support w VC, rate adjusted for hypercapnea   F/u ABG  Bicarb drip  Continue to follow labs    BMP  Lab Results   Component Value Date    NA  142 05/27/2019    K 4.0 05/27/2019     05/27/2019    CO2 13 (L) 05/27/2019    BUN 12 05/27/2019    CREATININE 0.8 05/27/2019    CALCIUM 8.4 (L) 05/27/2019    ANIONGAP 23 (H) 05/27/2019    ESTGFRAFRICA >60.0 05/27/2019    EGFRNONAA >60.0 05/27/2019           Critical Care Daily Checklist:    A: Awake: RASS Goal/Actual Goal:    Actual:     B: Spontaneous Breathing Trial Performed?     C: SAT & SBT Coordinated?  na                      D: Delirium: CAM-ICU     E: Early Mobility Performed? No   F: Feeding Goal:    Status:     Current Diet Order   Procedures    Diet NPO      AS: Analgesia/Sedation fentanyl   T: Thromboembolic Prophylaxis Heparin drip   H: HOB > 300 Yes   U: Stress Ulcer Prophylaxis (if needed) ppi   G: Glucose Control na   B: Bowel Function     I: Indwelling Catheter (Lines & Turner) Necessity turner   D: De-escalation of Antimicrobials/Pharmacotherapies Continue current regimen for now    Plan for the day/ETD Vent support  Bicarb drip    Code Status:  Family/Goals of Care: Partial Code  DNR       Critical secondary to Patient has a condition that poses threat to life and bodily function: Severe Respiratory Distress and s/p cardiac arrest     Critical care was time spent personally by me on the following activities: development of treatment plan with patient or surrogate and bedside caregivers, discussions with consultants, evaluation of patient's response to treatment, examination of patient, ordering and performing treatments and interventions, ordering and review of laboratory studies, ordering and review of radiographic studies, pulse oximetry, re-evaluation of patient's condition. This critical care time did not overlap with that of any other provider or involve time for any procedures.     Gwen Mulligan MD  Critical Care Medicine  [facility]

## 2019-05-27 NOTE — ED NOTES
LOC: Patient name and date of birth verified. The patient is unresponsive on the ventilator post cardiac arrest   SKIN: The skin is warm and dry, color consistent with ethnicity, patient has normal skin turgor and moist mucus membranes, skin intact, breakdown noted to chest post wu device  MUSCULOSKELETAL: Patient not moving extremities spontaneously but responds to pain  RESPIRATORY: Respirations are per ventilator  CARDIAC: Patient has an increased normal rate and rhythm.  ABDOMEN: Soft and non tender to palpation, no distention noted.   NEUROLOGIC: Patient intubated but not sedated, not moving spontaneously

## 2019-05-27 NOTE — HPI
77 yo F w PMH of HTN, CAD, COPD on 3L NC at home, pulmonary hypertension,  CAD with prior MI?, HFpEF, sent to the ED due to cardiac arrest. Pt's history mainly from epic records and ER documentation/ER records. Pt was having some SOB earlier this morning was coughing. EMS was called later as pt breathing difficulty is getting worse and when EMS arrived, pt was found to be unresponsive and in asystole. ACLS was started, per EMS, the patient received a total of 6 epi's, 1 bicarb, 2 mg atropine and 2 mg narcan. Her rhythm went into ventricular fibrillation, the EMS cardioverted and her rhythm changed to supraventricular tachycardia, where it remained despite 6 mg adenosine. EMS cardioverted the patient 2 more times, achieving return of spontaneous circulation at 1:32 pm. Patient was intubated when she arrived at the ED, with a large amount of blood in her tubing. Bounding carotid pulses were noted. She does not respond to painful stimuli.

## 2019-05-27 NOTE — ASSESSMENT & PLAN NOTE
Unknown reason for arrest, could be acute resp failure vs cardiac? Vs mucus plug     CT head: No acute infarct or acute hemorrhage or mass or fracture   Pt had been immobile for the past year    High risk of DVT/PE,    Acidosis at admission, planning on dialysis for temporarily fix her acidosis, will hold on CTA PE to avoid further damage to her kidneys, instead will initiate anticoagulation once CT head is negative for bleed;     At the ED, pt afebrile, WBC WNL, hgb 10, procal wnl, troponin 0.04.    vanco and cefepime given at ED   Follow up cultures+    EKG showed Sinus tachycardia vs  Atrial flutter with 2 to 1 block   On levophed and given iv steroids for concern for septic shock      CVC placed 5/27   F/u TTE and trop

## 2019-05-28 NOTE — SIGNIFICANT EVENT
Critical Care Medicine                                                                                   Death Note      Called to bedside by patient's nurse. Nursing supervisor notified.   Family at bedside.  has been called was also at bedside.    Patient is not responding to verbal or tactile stimuli.   Patient does not have a pupillary or corneal reflex.   Patient's pupils are fixed and dilated.   No heart or breath sounds on auscultation.   No respirations.   No palpable pulses.     Time of death @2344 on 5/27/19.     Cause of Death anoxic brain injury 2/2 to asystole cardiac event.                    Sukhdev Gaitan MD  Internal Medicine PGY-2  [phone]

## 2019-05-28 NOTE — PROGRESS NOTES
Received pt from ED on transport vent. Placed on  #28 on documented settings. Ett secured with commercial tube thomas 22 at lip. breth sounds equal and bilateral

## 2019-05-28 NOTE — SIGNIFICANT EVENT
Notified by nursing of myoclonic jerking movements. Pt examined. Pt with absent corneal reflexes and right pupil dilated and non-reactive. Critical Care medicine team discussed expected poor prognosis with family. Family has decided to proceed with withdrawal of care and comfort measures.  Patient terminally extubated with extended family at bedside. Opiates and bzos administered for pt comfort.  at bedside.    Critical Care time: 35 mins    Bren Hogan NP  Critical Care Medicine

## 2019-05-28 NOTE — PROGRESS NOTES
Patient transported from ED to [facility] on transport vent. Et tube secure. ambu bag at bedside. Report given to Jimenez DANIEL

## 2019-05-28 NOTE — DISCHARGE SUMMARY
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     Secondary Diagnoses:   Active Hospital Problems    Diagnosis  POA    *Cardiac arrest [I46.9]  Yes    Increased anion gap metabolic acidosis [E87.2]  Yes    Acute respiratory failure with hypoxia and hypercapnia [J96.01, J96.02]  Yes    CAD S/P percutaneous coronary angioplasty [I25.10, Z98.61]  Not Applicable     Chronic    Goals of care, counseling/discussion [Z71.89]  Not Applicable    Chronic respiratory failure with hypoxia [J96.11]  Yes      Resolved Hospital Problems   No resolved problems to display.        Discharged Condition:     HPI:  79 yo F w PMH of HTN, CAD, COPD on 3L NC at home, pulmonary hypertension,   CAD with prior MI?, HFpEF, sent to the ED due to cardiac arrest. Pt's history mainly from epic records and ER documentation/ER records. Pt was having some SOB earlier this morning was coughing. EMS was called later as pt breathing difficulty is getting worse and when EMS arrived, pt was found to be unresponsive and in asystole. ACLS was started, per EMS, the patient received a total of 6 epi's, 1 bicarb, 2 mg atropine and 2 mg narcan. Her rhythm went into ventricular fibrillation, the EMS cardioverted and her rhythm changed to supraventricular tachycardia, where it remained despite 6 mg adenosine. EMS cardioverted the patient 2 more times, achieving return of spontaneous circulation at 1:32 pm. Patient was intubated when she arrived at the ED, with a large amount of blood in her tubing. Bounding carotid pulses were noted. She does not respond to painful stimuli.     Hospital/ICU Course:  Shortly after admission, the patient was noted to have myoclonic jerking. Physical exam revealed absent corneal  reflexes and right pupil dilated and non-reactive. Consultations were held with the family regarding the patient's expected poor prognosis. At the direction of the family, the patient was extubated and measures to ensure the comfort of the patient including, but not limited to, morphine as needed for pain and air hunger as well as benzodiazepines as needed for agitation.  at bedside. Condolences offered. The patient was subsequently declared dead.      Bren Hogan NP  Critical Care Medicine

## 2019-06-01 LAB
BACTERIA BLD CULT: NORMAL
BACTERIA BLD CULT: NORMAL